# Patient Record
Sex: MALE | Race: BLACK OR AFRICAN AMERICAN | NOT HISPANIC OR LATINO | URBAN - METROPOLITAN AREA
[De-identification: names, ages, dates, MRNs, and addresses within clinical notes are randomized per-mention and may not be internally consistent; named-entity substitution may affect disease eponyms.]

---

## 2024-07-24 ENCOUNTER — OFFICE (OUTPATIENT)
Dept: URBAN - METROPOLITAN AREA CLINIC 11 | Facility: CLINIC | Age: 44
End: 2024-07-24
Payer: COMMERCIAL

## 2024-07-24 VITALS
HEART RATE: 69 BPM | WEIGHT: 281 LBS | OXYGEN SATURATION: 99 % | DIASTOLIC BLOOD PRESSURE: 119 MMHG | SYSTOLIC BLOOD PRESSURE: 187 MMHG | HEIGHT: 73 IN

## 2024-07-24 DIAGNOSIS — K62.89 OTHER SPECIFIED DISEASES OF ANUS AND RECTUM: ICD-10-CM

## 2024-07-24 DIAGNOSIS — K59.00 CONSTIPATION, UNSPECIFIED: ICD-10-CM

## 2024-07-24 DIAGNOSIS — K92.1 MELENA: ICD-10-CM

## 2024-07-24 DIAGNOSIS — K64.9 UNSPECIFIED HEMORRHOIDS: ICD-10-CM

## 2024-07-24 PROCEDURE — 99204 OFFICE O/P NEW MOD 45 MIN: CPT | Performed by: STUDENT IN AN ORGANIZED HEALTH CARE EDUCATION/TRAINING PROGRAM

## 2024-07-24 RX ORDER — SODIUM PICOSULFATE, MAGNESIUM OXIDE, AND ANHYDROUS CITRIC ACID 12; 3.5; 1 G/175ML; G/175ML; MG/175ML
LIQUID ORAL
Qty: 1 | Refills: 0 | Status: ACTIVE
Start: 2024-07-24

## 2024-07-24 NOTE — SERVICENOTES
we will schedule the patient for diagnostic colonoscopy to further evaluate his anorectal pain and rule out other courses of hematochezia.  I could not see a definite fissure on his exam today but his anal canal is pretty exquisitely tender and there is an external hemorrhoid present.  I am going to treat him empirically for a fissure with nitroglycerin with lidocaine and see how he feels.  Discussed with him the risks and benefits of the procedure and he agrees to proceed.  If he is not improving then we may try Anusol suppositories.  I counseled him on using Tylenol instead of ibuprofen but no more than the recommended at a dose on the bottle.  All questions addressed.

## 2024-07-24 NOTE — SERVICEHPINOTES
Mr. Ryan Diane is a 43-year-old  male with past medical history of diabetes, obesity, who presents to [place] [id number] for new onset hematochezia and constipation as well as anorectal pain.
br
br clinic visit 07/24/2024: 
br patient reports that over the last 4 months he has been more constipated.  He is having 1 bowel movement a day but he is having to strain and not feeling like he is completely evacuating his bowels.  He has had 2 episodes where he has had a good amount of blood on the tissue when he wipes after having particularly constipated episode.  He went to the urgent care and was evaluated there but no workup for medications were prescribed according to him.  He does take ibuprofen on a daily basis for the last 3 months for anorectal pain.  No family history of GI malignancies or liver disease.  No previous abdominal surgeries.  He has never had an EGD or colonoscopy.  he reports he does have some pain with sitting on occasion and he feels some external protrusions at times.